# Patient Record
Sex: FEMALE | Race: WHITE | NOT HISPANIC OR LATINO | URBAN - METROPOLITAN AREA
[De-identification: names, ages, dates, MRNs, and addresses within clinical notes are randomized per-mention and may not be internally consistent; named-entity substitution may affect disease eponyms.]

---

## 2019-09-04 ENCOUNTER — OFFICE (OUTPATIENT)
Dept: URBAN - METROPOLITAN AREA CLINIC 78 | Facility: CLINIC | Age: 79
End: 2019-09-04

## 2019-09-04 VITALS
DIASTOLIC BLOOD PRESSURE: 101 MMHG | TEMPERATURE: 97.6 F | DIASTOLIC BLOOD PRESSURE: 95 MMHG | WEIGHT: 135 LBS | HEIGHT: 65 IN | SYSTOLIC BLOOD PRESSURE: 183 MMHG | SYSTOLIC BLOOD PRESSURE: 170 MMHG | HEART RATE: 99 BPM

## 2019-09-04 DIAGNOSIS — K21.0 GASTRO-ESOPHAGEAL REFLUX DISEASE WITH ESOPHAGITIS: ICD-10-CM

## 2019-09-04 DIAGNOSIS — K59.09 OTHER CONSTIPATION: ICD-10-CM

## 2019-09-04 DIAGNOSIS — Z86.010 PERSONAL HISTORY OF COLONIC POLYPS: ICD-10-CM

## 2019-09-04 DIAGNOSIS — R12 HEARTBURN: ICD-10-CM

## 2019-09-04 DIAGNOSIS — N81.6 RECTOCELE: ICD-10-CM

## 2019-09-04 PROCEDURE — 99203 OFFICE O/P NEW LOW 30 MIN: CPT

## 2019-09-04 NOTE — SERVICEHPINOTES
Ms. Zuniga is here to discuss abdominal pain. 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 Her last colonoscopy was in July 2013 which showed a tubular adenoma given a 5 yr recall. When she made this appt, had nausea, bloating. Currently, no significant symptoms, has heartburn usually triggered by certain foods less than 3 x per week. No current abdominal pain. No dysphagia, early satiety, or anorexia. A gynecologist has recently dx her with a rectocele. She saw [facility] but left feeling confused. She has pressure in the rectum and vagina at times. She wants to go back to see them but doesn't want to drive to [facility]. She notes mild constipation with mild straining and incomplete evacuation. HIDA scan shows a gallbladder EF of 77%. She denies chest pain, SOB, constipation, diarrhea, abdominal pain, rectal bleeding, and weight loss. No known heart issues.

## 2020-06-17 ENCOUNTER — OFFICE (OUTPATIENT)
Dept: URBAN - METROPOLITAN AREA TELEHEALTH 3 | Facility: TELEHEALTH | Age: 80
End: 2020-06-17
Payer: COMMERCIAL

## 2020-06-17 VITALS — HEIGHT: 65 IN | WEIGHT: 134 LBS

## 2020-06-17 DIAGNOSIS — R93.89 ABNORMAL FINDINGS ON DIAGNOSTIC IMAGING OF OTHER SPECIFIED B: ICD-10-CM

## 2020-06-17 DIAGNOSIS — R12 HEARTBURN: ICD-10-CM

## 2020-06-17 DIAGNOSIS — R07.89 OTHER CHEST PAIN: ICD-10-CM

## 2020-06-17 DIAGNOSIS — K22.70 BARRETT'S ESOPHAGUS WITHOUT DYSPLASIA: ICD-10-CM

## 2020-06-17 PROCEDURE — 99214 OFFICE O/P EST MOD 30 MIN: CPT | Mod: 95 | Performed by: PHYSICIAN ASSISTANT

## 2020-06-17 NOTE — SERVICEHPINOTES
PATIENT VERIFIED BY DATE OF BIRTH AND NAME. Patient has been consented for this telecommunication visit.   Ms. Zuniga is here for f/u to recent ER visit. At the beginning of June, she developed a week of severe heartburn. Then on 6/6/20 she had chest pain that radiated to her back so she went to the ER. In the ER, she received a GI cocktail which alleviated symptoms. She was then started on Pantoprazole and Pepcid. Since that point in time, she has not experienced any further UGI issues and no alarm features. In 2013, she was dx with Mercedes's esophagus upon EGD. No EGD since then. She used to smoke cigarettes but has stopped this as well, no ETOH use. Her last colonoscopy in 2013 showed a tubular adenoma, she is overdue for a colonoscopy. No lower GI issues. No known heart problems.    Of note, in the ER, an U/S suggested cirrhosis of the liver. LFTs, platelets, and WBC are normal. No hx of ETOH. She notes that she has an enlarged belly and weight gain but this is recent prior to this she notes her BMI has always been normal. No hx of blood transfusions prior to 1992, IVDA, or tattoos. No known family hx of liver disease and no known autoimmune diseases. ROS as per HPI and o/w negative.

## 2020-06-17 NOTE — SERVICENOTES
Patient's visit was conducted through video telecommunication. Patient consented before the start of visit as to understanding of privacy concerns, possible technological failure, and their responsibility of carrying out instructions of plan., I have reviewed the history, physical exam, assessment and management plans.  I concur with or have edited all elements of her note.

## 2020-10-14 ENCOUNTER — OFFICE (OUTPATIENT)
Dept: URBAN - METROPOLITAN AREA CLINIC 102 | Facility: CLINIC | Age: 80
End: 2020-10-14
Payer: COMMERCIAL

## 2020-10-14 DIAGNOSIS — R93.89 ABNORMAL FINDINGS ON DIAGNOSTIC IMAGING OF OTHER SPECIFIED B: ICD-10-CM

## 2020-10-14 PROCEDURE — 91200 LIVER ELASTOGRAPHY: CPT | Performed by: INTERNAL MEDICINE

## 2023-01-26 ENCOUNTER — NEW REFERRAL (OUTPATIENT)
Dept: URBAN - METROPOLITAN AREA CLINIC 66 | Facility: CLINIC | Age: 83
End: 2023-01-26

## 2023-01-26 DIAGNOSIS — H25.13: ICD-10-CM

## 2023-01-26 DIAGNOSIS — H43.393: ICD-10-CM

## 2023-01-26 DIAGNOSIS — H35.62: ICD-10-CM

## 2023-01-26 DIAGNOSIS — H35.372: ICD-10-CM

## 2023-01-26 PROCEDURE — 92004 COMPRE OPH EXAM NEW PT 1/>: CPT

## 2023-01-26 PROCEDURE — 92202 OPSCPY EXTND ON/MAC DRAW: CPT

## 2023-01-26 PROCEDURE — 92134 CPTRZ OPH DX IMG PST SGM RTA: CPT

## 2023-01-26 ASSESSMENT — TONOMETRY
OS_IOP_MMHG: 14
OD_IOP_MMHG: 18

## 2023-01-26 ASSESSMENT — VISUAL ACUITY
OS_SC: 20/40
OD_SC: 20/40

## 2023-03-30 ENCOUNTER — FOLLOW UP (OUTPATIENT)
Dept: URBAN - METROPOLITAN AREA CLINIC 66 | Facility: CLINIC | Age: 83
End: 2023-03-30

## 2023-03-30 DIAGNOSIS — H35.62: ICD-10-CM

## 2023-03-30 DIAGNOSIS — H35.372: ICD-10-CM

## 2023-03-30 PROCEDURE — 92014 COMPRE OPH EXAM EST PT 1/>: CPT

## 2023-03-30 PROCEDURE — 92201 OPSCPY EXTND RTA DRAW UNI/BI: CPT

## 2023-03-30 ASSESSMENT — VISUAL ACUITY
OS_SC: 20/40+2
OD_SC: 20/40+2

## 2023-03-30 ASSESSMENT — TONOMETRY
OS_IOP_MMHG: 17
OD_IOP_MMHG: 16